# Patient Record
Sex: FEMALE | Race: WHITE | NOT HISPANIC OR LATINO | URBAN - NONMETROPOLITAN AREA
[De-identification: names, ages, dates, MRNs, and addresses within clinical notes are randomized per-mention and may not be internally consistent; named-entity substitution may affect disease eponyms.]

---

## 2023-11-01 ENCOUNTER — OFFICE (OUTPATIENT)
Dept: URBAN - NONMETROPOLITAN AREA CLINIC 1 | Facility: CLINIC | Age: 43
End: 2023-11-01
Payer: COMMERCIAL

## 2023-11-01 VITALS
WEIGHT: 79 LBS | SYSTOLIC BLOOD PRESSURE: 100 MMHG | HEIGHT: 62 IN | DIASTOLIC BLOOD PRESSURE: 59 MMHG | HEART RATE: 62 BPM

## 2023-11-01 DIAGNOSIS — R10.13 EPIGASTRIC PAIN: ICD-10-CM

## 2023-11-01 DIAGNOSIS — R68.81 EARLY SATIETY: ICD-10-CM

## 2023-11-01 DIAGNOSIS — G35 MULTIPLE SCLEROSIS: ICD-10-CM

## 2023-11-01 PROCEDURE — 99204 OFFICE O/P NEW MOD 45 MIN: CPT | Performed by: NURSE PRACTITIONER

## 2023-11-01 NOTE — SERVICEHPINOTES
She was seen at the emergency room 10/30/2023 for complaints of abdominal pain.  She has right upper quadrant and right mid abdomen pain that can be severe at times.    She describes it as a twisting or knotting pain.  Some nausea.   She takes pantoprazole 40 mg daily for GERD.    She has normal bowel movements and has not had signs of bleeding.  She says she was supposed to have her gallbladder removed with Dr. Hernandez, but he is no longer takes her insurance.     Her chronic illnesses include multiple sclerosis, GERD.br   
br At the ER 10/30/23:
brCBC and CMP are unremarkable.  Normal liver labs except AST 43.
br 
br CT A/P wc:br1. Gastric wall thickening in the region of the antrum that is brprobably related to gastritis/antritis.br2. No free air or fluid.br3. Aortoiliac atherosclerosis.br4. Hysterectomy.
br

## 2023-11-01 NOTE — SERVICENOTES
The risks for EGD were discussed with her and she agrees to proceed.  
 Continue PPI daily.
 Follow up will depend on her procedure results.